# Patient Record
Sex: FEMALE | Race: WHITE | NOT HISPANIC OR LATINO
[De-identification: names, ages, dates, MRNs, and addresses within clinical notes are randomized per-mention and may not be internally consistent; named-entity substitution may affect disease eponyms.]

---

## 2019-01-06 ENCOUNTER — TRANSCRIPTION ENCOUNTER (OUTPATIENT)
Age: 29
End: 2019-01-06

## 2021-09-03 ENCOUNTER — TRANSCRIPTION ENCOUNTER (OUTPATIENT)
Age: 31
End: 2021-09-03

## 2021-09-25 ENCOUNTER — TRANSCRIPTION ENCOUNTER (OUTPATIENT)
Age: 31
End: 2021-09-25

## 2022-01-30 ENCOUNTER — FORM ENCOUNTER (OUTPATIENT)
Age: 32
End: 2022-01-30

## 2022-12-28 DIAGNOSIS — Z12.39 ENCOUNTER FOR OTHER SCREENING FOR MALIGNANT NEOPLASM OF BREAST: ICD-10-CM

## 2023-01-03 ENCOUNTER — APPOINTMENT (OUTPATIENT)
Dept: OBGYN | Facility: CLINIC | Age: 33
End: 2023-01-03
Payer: COMMERCIAL

## 2023-01-03 VITALS
HEART RATE: 118 BPM | HEIGHT: 62 IN | OXYGEN SATURATION: 97 % | DIASTOLIC BLOOD PRESSURE: 88 MMHG | BODY MASS INDEX: 49.96 KG/M2 | WEIGHT: 271.5 LBS | SYSTOLIC BLOOD PRESSURE: 140 MMHG | RESPIRATION RATE: 16 BRPM

## 2023-01-03 DIAGNOSIS — Z82.49 FAMILY HISTORY OF ISCHEMIC HEART DISEASE AND OTHER DISEASES OF THE CIRCULATORY SYSTEM: ICD-10-CM

## 2023-01-03 DIAGNOSIS — F12.91 CANNABIS USE, UNSPECIFIED, IN REMISSION: ICD-10-CM

## 2023-01-03 DIAGNOSIS — Z12.4 ENCOUNTER FOR SCREENING FOR MALIGNANT NEOPLASM OF CERVIX: ICD-10-CM

## 2023-01-03 DIAGNOSIS — Z13.31 ENCOUNTER FOR SCREENING FOR DEPRESSION: ICD-10-CM

## 2023-01-03 DIAGNOSIS — Z78.9 OTHER SPECIFIED HEALTH STATUS: ICD-10-CM

## 2023-01-03 DIAGNOSIS — I10 ESSENTIAL (PRIMARY) HYPERTENSION: ICD-10-CM

## 2023-01-03 DIAGNOSIS — Z01.419 ENCOUNTER FOR GYNECOLOGICAL EXAMINATION (GENERAL) (ROUTINE) W/OUT ABNORMAL FINDINGS: ICD-10-CM

## 2023-01-03 PROCEDURE — 99385 PREV VISIT NEW AGE 18-39: CPT

## 2023-01-03 PROCEDURE — 81025 URINE PREGNANCY TEST: CPT

## 2023-01-03 PROCEDURE — 81002 URINALYSIS NONAUTO W/O SCOPE: CPT

## 2023-01-03 PROCEDURE — 99395 PREV VISIT EST AGE 18-39: CPT

## 2023-01-03 NOTE — COUNSELING
[Nutrition/ Exercise/ Weight Management] : nutrition, exercise, weight management [Body Image] : body image [Vitamins/Supplements] : vitamins/supplements [Sunscreen] : sunscreen [Breast Self Exam] : breast self exam [Bladder Hygiene] : bladder hygiene [Preconception Care/ Fertility options] : preconception care, fertility options [Pregnancy Options] : pregnancy options [FreeTextEntry2] : Patient's blood pressure in the office was taken multiple times on multiple upper extremities both sitting and lying down.  Her blood pressure was 140/88 and 140/100 during this visit.  The patient is well aware and will follow up with internal medicine.

## 2023-01-03 NOTE — HISTORY OF PRESENT ILLNESS
[Y] : Patient is sexually active [N] : Patient denies prior pregnancies [Currently Active] : currently active [Men] : men [LMPDate] : 12/25/22 [TextBox_6] : 12/25/22 [TextBox_9] : 13 [FreeTextEntry1] : 12/25/22

## 2023-01-03 NOTE — PHYSICAL EXAM
[Chaperone Present] : A chaperone was present in the examining room during all aspects of the physical examination [Appropriately responsive] : appropriately responsive [Alert] : alert [No Acute Distress] : no acute distress [No Lymphadenopathy] : no lymphadenopathy [Regular Rate Rhythm] : regular rate rhythm [No Murmurs] : no murmurs [Clear to Auscultation B/L] : clear to auscultation bilaterally [Soft] : soft [Non-tender] : non-tender [Non-distended] : non-distended [No HSM] : No HSM [No Lesions] : no lesions [No Mass] : no mass [Oriented x3] : oriented x3 [Examination Of The Breasts] : a normal appearance [No Masses] : no breast masses were palpable [Labia Majora] : normal [Labia Minora] : normal [Normal] : normal [Uterine Adnexae] : normal [FreeTextEntry1] : The documentation for this encounter was entered by Marquez Moore acting as a scribe for Dr. Ndiaye. [FreeTextEntry8] : Top normal size uterus anteverted anteflexed no adnexal masses.

## 2023-01-03 NOTE — PLAN
[FreeTextEntry1] : I have spent 40 minutes of time on this encounter.  Greater than 50% of the face-to-face encounter time was spent on counseling and/or coordination of care for examination findings, differential, testing, management and planning. 10 minutes were allotted to discussing the depression screening.\par \par Yearly breast cancer screening with no current clinical or radiographic concerns.  The patient was reminded regarding future well breast and general healthcare, breast cancer risk reduction, the importance of self-examination and the need for follow up.   She was again reminded of the need to take Vit D3 2500  IU daily or to keep a Vit D level above 30, and Tumeric 1000mg daily with Black Pepper.  Plan continued yearly imaging and breast follow up, sooner as needed.  I counseled the patient on current recommendations to reduce breast cancer risk including but not inclusive to regular exercise 20-30 minutes 3-4 times a week, low fat diet, limiting alcohol consumption, maintenance of ideal body weight, yearly imaging and self breast awareness.  Questions answered.  I encouraged in light of Covid 19, social distancing, frequent hand washing and precautions to stay healthy.\par \par Recommended pt start seeing an internal medicine to evaluate for hypertension and possible to start medication if appropriate. I discussed working with her PMD if medicine is necessary as she desires pregnancy in the near future.

## 2023-01-05 LAB
HPV 16 E6+E7 MRNA CVX QL NAA+PROBE: NOT DETECTED
HPV18+45 E6+E7 MRNA CVX QL NAA+PROBE: NOT DETECTED

## 2023-01-10 LAB — CYTOLOGY CVX/VAG DOC THIN PREP: NORMAL

## 2023-01-11 ENCOUNTER — APPOINTMENT (OUTPATIENT)
Dept: FAMILY MEDICINE | Facility: CLINIC | Age: 33
End: 2023-01-11
Payer: COMMERCIAL

## 2023-01-11 ENCOUNTER — TRANSCRIPTION ENCOUNTER (OUTPATIENT)
Age: 33
End: 2023-01-11

## 2023-01-11 VITALS
DIASTOLIC BLOOD PRESSURE: 97 MMHG | RESPIRATION RATE: 15 BRPM | HEART RATE: 98 BPM | SYSTOLIC BLOOD PRESSURE: 157 MMHG | BODY MASS INDEX: 48.95 KG/M2 | HEIGHT: 62 IN | TEMPERATURE: 98 F | WEIGHT: 266 LBS | OXYGEN SATURATION: 98 %

## 2023-01-11 VITALS — SYSTOLIC BLOOD PRESSURE: 125 MMHG | DIASTOLIC BLOOD PRESSURE: 82 MMHG

## 2023-01-11 DIAGNOSIS — Z80.6 FAMILY HISTORY OF LEUKEMIA: ICD-10-CM

## 2023-01-11 DIAGNOSIS — Z83.3 FAMILY HISTORY OF DIABETES MELLITUS: ICD-10-CM

## 2023-01-11 DIAGNOSIS — Z83.2 FAMILY HISTORY OF DISEASES OF THE BLOOD AND BLOOD-FORMING ORGANS AND CERTAIN DISORDERS INVOLVING THE IMMUNE MECHANISM: ICD-10-CM

## 2023-01-11 DIAGNOSIS — Z00.00 ENCOUNTER FOR GENERAL ADULT MEDICAL EXAMINATION W/OUT ABNORMAL FINDINGS: ICD-10-CM

## 2023-01-11 DIAGNOSIS — Z80.3 FAMILY HISTORY OF MALIGNANT NEOPLASM OF BREAST: ICD-10-CM

## 2023-01-11 PROCEDURE — 99385 PREV VISIT NEW AGE 18-39: CPT

## 2023-01-11 NOTE — ASSESSMENT
[FreeTextEntry1] : Physical Exam:\par Constitutional: No acute distress, well appearing\par HEENT: Normocephalic, atraumatic\par Neck: supple\par Cardiac:  Regular rate and rhythm, No murmurs\par Pulmonary: No respiratory distress, Lungs clear to auscultation bilaterally, no wheezing, rales, or rhonchi\par Abdomen: Soft, non-tender, non-distended, no guarding, normal bowel sounds\par Vascular: No peripheral edema\par Neurology: Coordination grossly intact, no focal deficits\par Psychiatric: Alert and oriented x3, normal mood\par \par \par \par A/P:\par HCM:\par - she will get fasting bloodwork done at outside lab\par - flu- uTD\par - TDAP- deferred\par - cervical CA screening- UTD\par - although bp was elevated at her gyn office it was normal here. checked twice. likely labile BP with stress. pt knows to regardless try to cut down on caffeine intake to no more than 2 cups daily and decrease sodium intake\par - if pt BP elevated again in future, advised to rtc\par \par

## 2023-01-11 NOTE — HISTORY OF PRESENT ILLNESS
[FreeTextEntry1] : establish care/ cpe.  [de-identified] : 32y Fw/ PMhx environmental allergies presenting to establish care/ cpe.\par \par  she reports that her BP was elevated on last visit to her gyn.140/88  however she thinks its bc she was nervous. it was elevated only on her last visit. was never told of having high BP in the past before.  usually has 3 cups coffee daily. does not take nsaids daily. generally cooks most nights but recently has been ordering more takeout. maternal uncles have HTN. denies headache, vision changes, palpitations, chest pain, sob, leg swelling. \par \par taking prenatal vitamins. she and her  started trying to get pregnant this month. came off junel in november. \par \par she gets allergy shots for multiple environmental allergies with Dr. tarik nuñez. cats, raw nuts, grass, trees. gets shots every other week in his office.

## 2023-01-11 NOTE — HEALTH RISK ASSESSMENT
[Never] : Never [Monthly or less (1 pt)] : Monthly or less (1 point) [1 or 2 (0 pts)] : 1 or 2 (0 points) [Never (0 pts)] : Never (0 points) [Yes] : In the past 12 months have you used drugs other than those required for medical reasons? Yes [0] : 2) Feeling down, depressed, or hopeless: Not at all (0) [PHQ-2 Negative - No further assessment needed] : PHQ-2 Negative - No further assessment needed [Patient reported PAP Smear was normal] : Patient reported PAP Smear was normal [HIV Test offered] : HIV Test offered [Hepatitis C test offered] : Hepatitis C test offered [With Family] : lives with family [Employed] : employed [] :  [Audit-CScore] : 1 [de-identified] : marijuan gummies [VDT9Baigc] : 0 [PapSmearDate] : 01/2023 [PapSmearComments] : gyn Dr. Ndiaye [FreeTextEntry2] : .

## 2023-01-17 ENCOUNTER — LABORATORY RESULT (OUTPATIENT)
Age: 33
End: 2023-01-17

## 2023-01-17 ENCOUNTER — TRANSCRIPTION ENCOUNTER (OUTPATIENT)
Age: 33
End: 2023-01-17

## 2023-01-17 DIAGNOSIS — R71.8 OTHER ABNORMALITY OF RED BLOOD CELLS: ICD-10-CM

## 2023-01-17 LAB
ALBUMIN SERPL ELPH-MCNC: 3.9 G/DL
ALP BLD-CCNC: 141 U/L
ALT SERPL-CCNC: 52 U/L
ANION GAP SERPL CALC-SCNC: 11 MMOL/L
AST SERPL-CCNC: 26 U/L
BASOPHILS # BLD AUTO: 0.05 K/UL
BASOPHILS NFR BLD AUTO: 0.4 %
BILIRUB SERPL-MCNC: <0.2 MG/DL
BUN SERPL-MCNC: 12 MG/DL
CALCIUM SERPL-MCNC: 9.2 MG/DL
CHLORIDE SERPL-SCNC: 107 MMOL/L
CHOLEST SERPL-MCNC: 173 MG/DL
CO2 SERPL-SCNC: 22 MMOL/L
CREAT SERPL-MCNC: 0.62 MG/DL
EGFR: 122 ML/MIN/1.73M2
EOSINOPHIL # BLD AUTO: 0.37 K/UL
EOSINOPHIL NFR BLD AUTO: 2.8 %
ESTIMATED AVERAGE GLUCOSE: 108 MG/DL
GLUCOSE SERPL-MCNC: 96 MG/DL
HBA1C MFR BLD HPLC: 5.4 %
HCT VFR BLD CALC: 39.4 %
HCV AB SER QL: NONREACTIVE
HCV S/CO RATIO: 0.11 S/CO
HDLC SERPL-MCNC: 49 MG/DL
HGB BLD-MCNC: 12.5 G/DL
HIV1+2 AB SPEC QL IA.RAPID: NONREACTIVE
IMM GRANULOCYTES NFR BLD AUTO: 0.4 %
LDLC SERPL CALC-MCNC: 98 MG/DL
LYMPHOCYTES # BLD AUTO: 2.99 K/UL
LYMPHOCYTES NFR BLD AUTO: 23 %
MAN DIFF?: NORMAL
MCHC RBC-ENTMCNC: 21.6 PG
MCHC RBC-ENTMCNC: 31.7 GM/DL
MCV RBC AUTO: 68.2 FL
MONOCYTES # BLD AUTO: 0.92 K/UL
MONOCYTES NFR BLD AUTO: 7.1 %
NEUTROPHILS # BLD AUTO: 8.64 K/UL
NEUTROPHILS NFR BLD AUTO: 66.3 %
NONHDLC SERPL-MCNC: 124 MG/DL
PLATELET # BLD AUTO: 341 K/UL
POTASSIUM SERPL-SCNC: 4.4 MMOL/L
PROT SERPL-MCNC: 6.1 G/DL
RBC # BLD: 5.78 M/UL
RBC # FLD: 17.6 %
SODIUM SERPL-SCNC: 140 MMOL/L
T4 FREE SERPL-MCNC: 0.9 NG/DL
TRIGL SERPL-MCNC: 131 MG/DL
TSH SERPL-ACNC: 4.08 UIU/ML
WBC # FLD AUTO: 13.02 K/UL

## 2023-02-08 ENCOUNTER — NON-APPOINTMENT (OUTPATIENT)
Age: 33
End: 2023-02-08

## 2023-06-16 ENCOUNTER — APPOINTMENT (OUTPATIENT)
Dept: FAMILY MEDICINE | Facility: CLINIC | Age: 33
End: 2023-06-16
Payer: COMMERCIAL

## 2023-06-16 ENCOUNTER — NON-APPOINTMENT (OUTPATIENT)
Age: 33
End: 2023-06-16

## 2023-06-16 VITALS
SYSTOLIC BLOOD PRESSURE: 138 MMHG | HEIGHT: 62 IN | WEIGHT: 266 LBS | HEART RATE: 111 BPM | OXYGEN SATURATION: 99 % | TEMPERATURE: 98.2 F | RESPIRATION RATE: 18 BRPM | DIASTOLIC BLOOD PRESSURE: 104 MMHG | BODY MASS INDEX: 48.95 KG/M2

## 2023-06-16 DIAGNOSIS — F41.9 ANXIETY DISORDER, UNSPECIFIED: ICD-10-CM

## 2023-06-16 PROCEDURE — 99214 OFFICE O/P EST MOD 30 MIN: CPT

## 2023-06-16 NOTE — HISTORY OF PRESENT ILLNESS
[FreeTextEntry8] : 32-year-old female who is undergoing infertility treatment, noted to have elevated blood pressure at the visit.\par She states she has PCOS and a blocked fallopian tube.\par Blood pressure was elevated last 3 visits with Dr. Huerta. She has been stressed because of work and w/u for infertility.\par States her treatment was cancelled because her BP was high.\par Tearful today because she really wants to have a baby.\par

## 2023-06-16 NOTE — ASSESSMENT
[FreeTextEntry1] : HTN\par Start Labetalol 100 mg bid\par Getting infertility treatments\par Follow up in 4 weeks for BP check\par Low salt/ exercise/ weight loss will help.\par Will fill clearance form at next visit. \par \par

## 2023-06-16 NOTE — PHYSICAL EXAM
[Normal Sclera/Conjunctiva] : normal sclera/conjunctiva [No Rash] : no rash [Coordination Grossly Intact] : coordination grossly intact [Normal Gait] : normal gait [Normal] : affect was normal and insight and judgment were intact

## 2023-06-22 ENCOUNTER — APPOINTMENT (OUTPATIENT)
Dept: INTERNAL MEDICINE | Facility: CLINIC | Age: 33
End: 2023-06-22

## 2023-07-20 ENCOUNTER — APPOINTMENT (OUTPATIENT)
Dept: INTERNAL MEDICINE | Facility: CLINIC | Age: 33
End: 2023-07-20
Payer: COMMERCIAL

## 2023-07-20 VITALS
DIASTOLIC BLOOD PRESSURE: 85 MMHG | WEIGHT: 266 LBS | OXYGEN SATURATION: 98 % | RESPIRATION RATE: 18 BRPM | SYSTOLIC BLOOD PRESSURE: 121 MMHG | TEMPERATURE: 97.8 F | HEIGHT: 62 IN | BODY MASS INDEX: 48.95 KG/M2 | HEART RATE: 102 BPM

## 2023-07-20 DIAGNOSIS — Z86.39 PERSONAL HISTORY OF OTHER ENDOCRINE, NUTRITIONAL AND METABOLIC DISEASE: ICD-10-CM

## 2023-07-20 PROCEDURE — 99214 OFFICE O/P EST MOD 30 MIN: CPT | Mod: 25

## 2023-07-20 PROCEDURE — 36415 COLL VENOUS BLD VENIPUNCTURE: CPT

## 2023-07-20 NOTE — HISTORY OF PRESENT ILLNESS
[FreeTextEntry1] : f/u [de-identified] : 32y F w/ PMhx obesity, HTN, presenting for f/u. she also brought medical clearance form from her fertility doctor. \par \par  Dr. Yuri Bacon at [facility] is her Fertility doctor. states shes been trying to conceive since 11/2022. states she has hx irreg periods due to PCOS. her period last came on 06/2022.she was put on progesterone x 7days in march which caused her to have her period in april. and then didn’t have another period until june w/ progesterone again. states she doesn’t get her periods without it. she is not on it right now. during testing she was also found to have blocked Fallopian tube on her left side. \par \par states initiation of her fertility tx was put on hold due to her high BP. she is looking to get cleared to start her fertility tx. she was diagnosed w/ HTN last visit with Dr. Tolbert in june and was started on labetalol 100 BID. states she has been compliant and tolerating well. she checks her BP at home. BP was 121/26 this morning and was 130/80 2 weeks ago. denies cp, sob, lightheaded, dizziness, palpitations. \par \par had elevated LFTs in january. states she had drank a good amount of alcohol just prior to her bloodwork. also had elevated wbc in january. did not f/u for repeat LFTs and CBC testing. states  today she had hx iron def in the past and also states she was found to have thalassemia trait with her fertility dr during her workup. she is on prenatals. \par \par states she drank 8 canned alcoholic seltzers and 2 shots of tequila on saturday due to an all day event. states she normally doesn’t drink as much and does not drink throughout the week. \par \par she also takes 2000iu vitamin d, and a fertility supplement.

## 2023-07-20 NOTE — ASSESSMENT
[FreeTextEntry1] : Physical Exam:\par Constitutional: No acute distress, well appearing\par HEENT: Normocephalic, atraumatic\par Neck: supple\par Cardiac:  Regular rate and rhythm, No murmurs\par Pulmonary: No respiratory distress, Lungs clear to auscultation bilaterally, no wheezing, rales, or rhonchi\par Abdomen: Soft, non-tender, non-distended, no guarding, normal bowel sounds\par Vascular: No peripheral edema\par Neurology: Coordination grossly intact, no focal deficits\par Psychiatric: Alert and oriented x3, normal mood\par \par \par \par A/P:\par HTN\par improvement in BP\par - recommended she c/w current dosage\par - was agreeable to f/u in 3 months for continued management\par \par infertility\par states due to hx PCOS and blocked fallopian tube\par - cleared her today to start tx\par \par elevated LFTs \par denies hx frequent alcohol use\par - will check LFTs today for trend. if continues to be elevated will consider getting liver sono\par \par elevated wbc\par reports today she was found to have thalassemia trait on recent testing\par states also had hx iron def\par - will repeat cbc today - bloodwork drawn in office\par - will check iron panel as well\par - cw prenatals\par

## 2023-07-25 ENCOUNTER — NON-APPOINTMENT (OUTPATIENT)
Age: 33
End: 2023-07-25

## 2023-07-25 ENCOUNTER — RESULT REVIEW (OUTPATIENT)
Age: 33
End: 2023-07-25

## 2023-07-25 LAB
ALBUMIN SERPL ELPH-MCNC: 4.6 G/DL
ALP BLD-CCNC: 199 U/L
ALT SERPL-CCNC: 91 U/L
ANION GAP SERPL CALC-SCNC: 14 MMOL/L
AST SERPL-CCNC: 50 U/L
BILIRUB SERPL-MCNC: 0.4 MG/DL
BUN SERPL-MCNC: 11 MG/DL
CALCIUM SERPL-MCNC: 9.9 MG/DL
CHLORIDE SERPL-SCNC: 104 MMOL/L
CO2 SERPL-SCNC: 20 MMOL/L
CREAT SERPL-MCNC: 0.66 MG/DL
EGFR: 120 ML/MIN/1.73M2
FERRITIN SERPL-MCNC: 131 NG/ML
GLUCOSE SERPL-MCNC: 78 MG/DL
IRON SATN MFR SERPL: 19 %
IRON SERPL-MCNC: 70 UG/DL
POTASSIUM SERPL-SCNC: 5 MMOL/L
PROT SERPL-MCNC: 7.4 G/DL
SODIUM SERPL-SCNC: 138 MMOL/L
TIBC SERPL-MCNC: 367 UG/DL
TRANSFERRIN SERPL-MCNC: 274 MG/DL
UIBC SERPL-MCNC: 297 UG/DL

## 2023-08-08 ENCOUNTER — TRANSCRIPTION ENCOUNTER (OUTPATIENT)
Age: 33
End: 2023-08-08

## 2023-08-23 ENCOUNTER — OUTPATIENT (OUTPATIENT)
Dept: OUTPATIENT SERVICES | Facility: HOSPITAL | Age: 33
LOS: 1 days | End: 2023-08-23
Payer: COMMERCIAL

## 2023-08-23 ENCOUNTER — APPOINTMENT (OUTPATIENT)
Dept: ULTRASOUND IMAGING | Facility: CLINIC | Age: 33
End: 2023-08-23
Payer: COMMERCIAL

## 2023-08-23 DIAGNOSIS — R74.8 ABNORMAL LEVELS OF OTHER SERUM ENZYMES: ICD-10-CM

## 2023-08-23 PROCEDURE — 76705 ECHO EXAM OF ABDOMEN: CPT | Mod: 26

## 2023-08-23 PROCEDURE — 76705 ECHO EXAM OF ABDOMEN: CPT

## 2023-10-18 ENCOUNTER — APPOINTMENT (OUTPATIENT)
Dept: INTERNAL MEDICINE | Facility: CLINIC | Age: 33
End: 2023-10-18
Payer: COMMERCIAL

## 2023-10-18 VITALS
SYSTOLIC BLOOD PRESSURE: 141 MMHG | DIASTOLIC BLOOD PRESSURE: 90 MMHG | RESPIRATION RATE: 16 BRPM | OXYGEN SATURATION: 96 % | HEART RATE: 89 BPM | TEMPERATURE: 98.4 F | HEIGHT: 62 IN

## 2023-10-18 VITALS — SYSTOLIC BLOOD PRESSURE: 120 MMHG | DIASTOLIC BLOOD PRESSURE: 85 MMHG

## 2023-10-18 DIAGNOSIS — Z31.9 ENCOUNTER FOR PROCREATIVE MANAGEMENT, UNSPECIFIED: ICD-10-CM

## 2023-10-18 PROCEDURE — 99214 OFFICE O/P EST MOD 30 MIN: CPT

## 2023-10-18 RX ORDER — LABETALOL HYDROCHLORIDE 100 MG/1
100 TABLET, FILM COATED ORAL
Qty: 180 | Refills: 0 | Status: ACTIVE | COMMUNITY
Start: 2023-06-16 | End: 1900-01-01

## 2024-01-10 ENCOUNTER — APPOINTMENT (OUTPATIENT)
Dept: OBGYN | Facility: CLINIC | Age: 34
End: 2024-01-10

## 2024-01-11 ENCOUNTER — TRANSCRIPTION ENCOUNTER (OUTPATIENT)
Age: 34
End: 2024-01-11

## 2024-01-24 ENCOUNTER — APPOINTMENT (OUTPATIENT)
Dept: INTERNAL MEDICINE | Facility: CLINIC | Age: 34
End: 2024-01-24

## 2024-02-21 ENCOUNTER — TRANSCRIPTION ENCOUNTER (OUTPATIENT)
Age: 34
End: 2024-02-21

## 2024-03-12 ENCOUNTER — NON-APPOINTMENT (OUTPATIENT)
Age: 34
End: 2024-03-12

## 2024-03-12 ENCOUNTER — APPOINTMENT (OUTPATIENT)
Dept: INTERNAL MEDICINE | Facility: CLINIC | Age: 34
End: 2024-03-12
Payer: COMMERCIAL

## 2024-03-12 VITALS
TEMPERATURE: 98.1 F | SYSTOLIC BLOOD PRESSURE: 142 MMHG | WEIGHT: 255 LBS | DIASTOLIC BLOOD PRESSURE: 86 MMHG | OXYGEN SATURATION: 97 % | HEIGHT: 62 IN | HEART RATE: 87 BPM | RESPIRATION RATE: 16 BRPM | BODY MASS INDEX: 46.93 KG/M2

## 2024-03-12 VITALS — DIASTOLIC BLOOD PRESSURE: 85 MMHG | SYSTOLIC BLOOD PRESSURE: 140 MMHG

## 2024-03-12 DIAGNOSIS — R74.8 ABNORMAL LEVELS OF OTHER SERUM ENZYMES: ICD-10-CM

## 2024-03-12 DIAGNOSIS — D72.829 ELEVATED WHITE BLOOD CELL COUNT, UNSPECIFIED: ICD-10-CM

## 2024-03-12 DIAGNOSIS — I10 ESSENTIAL (PRIMARY) HYPERTENSION: ICD-10-CM

## 2024-03-12 PROCEDURE — 99214 OFFICE O/P EST MOD 30 MIN: CPT

## 2024-03-12 PROCEDURE — G2211 COMPLEX E/M VISIT ADD ON: CPT | Mod: NC,1L

## 2024-03-12 NOTE — ASSESSMENT
[FreeTextEntry1] : Physical Exam: Constitutional: No acute distress, well appearing HEENT: Normocephalic, atraumatic Neck: supple Cardiac:  Regular rate and rhythm, No murmurs Pulmonary: No respiratory distress, Lungs clear to auscultation bilaterally, no wheezing, rales, or rhonchi Abdomen: Soft, non-tender, non-distended, no guarding, normal bowel sounds Vascular: No peripheral edema Neurology: Coordination grossly intact, no focal deficits Psychiatric: Alert and oriented x3, normal mood     A/P: HTN on labetalol 100 BID states BP has been controlled at home. she checks regularly and is usually 120-130s systolic a little elevated in office today states she just had chicken sausages right before for lunch - likely salt related - recommend c/w labetalol 100 BID - recommend hydration, salt reduction, and to continue to monitor BP daily and inform gyn if BP increases any further  elevated LFTs previous hx elevated LFTs liver sono showed fatty liver she saw [facility] GI Dr Aisha Mojica- she was reassured levels were from her fatty liver. states LFTs have been normal since checked during her pregnancy - recommended she have copy of most recent labs sent over  elevated wbc she saw heme onc Dr. arteaga 8/2023- had workup and was told that leukocytosis is attributed to PCOS obtained workup results from heme/onc. noted wbc improved to 12.8  - rec she have most recent copy of results sent over  pregnancy doing well  states had nausea/ vomiting during first tremester but feeling better now denies leg swelling, cp, palpitations - recommend regular f/u with her obgyn and MFM

## 2024-03-12 NOTE — HISTORY OF PRESENT ILLNESS
[de-identified] : NETTIE FELIZ is a 33 year old female with PMHx HTN, obesity, PCOS, presenting for f/u. states her MFM recommended she come to PCP for EKG. she is doing well, no complaints. she is 19 weeks pregnant.

## 2024-07-09 ENCOUNTER — NON-APPOINTMENT (OUTPATIENT)
Age: 34
End: 2024-07-09

## 2024-08-06 ENCOUNTER — NON-APPOINTMENT (OUTPATIENT)
Age: 34
End: 2024-08-06